# Patient Record
Sex: MALE | Race: WHITE | NOT HISPANIC OR LATINO | Employment: FULL TIME | URBAN - METROPOLITAN AREA
[De-identification: names, ages, dates, MRNs, and addresses within clinical notes are randomized per-mention and may not be internally consistent; named-entity substitution may affect disease eponyms.]

---

## 2023-11-09 ENCOUNTER — TELEPHONE (OUTPATIENT)
Dept: ORTHOPEDIC SURGERY | Facility: CLINIC | Age: 53
End: 2023-11-09
Payer: COMMERCIAL

## 2023-11-13 ENCOUNTER — APPOINTMENT (OUTPATIENT)
Dept: ORTHOPEDIC SURGERY | Facility: CLINIC | Age: 53
End: 2023-11-13
Payer: COMMERCIAL

## 2023-11-15 ENCOUNTER — HOSPITAL ENCOUNTER (OUTPATIENT)
Dept: RADIOLOGY | Facility: HOSPITAL | Age: 53
Discharge: HOME | End: 2023-11-15
Payer: COMMERCIAL

## 2023-11-15 ENCOUNTER — OFFICE VISIT (OUTPATIENT)
Dept: ORTHOPEDIC SURGERY | Facility: CLINIC | Age: 53
End: 2023-11-15
Payer: COMMERCIAL

## 2023-11-15 VITALS — WEIGHT: 235 LBS | HEIGHT: 72 IN | BODY MASS INDEX: 31.83 KG/M2

## 2023-11-15 DIAGNOSIS — M76.31 ILIOTIBIAL BAND SYNDROME OF RIGHT SIDE: ICD-10-CM

## 2023-11-15 DIAGNOSIS — M25.561 RIGHT KNEE PAIN, UNSPECIFIED CHRONICITY: ICD-10-CM

## 2023-11-15 PROCEDURE — 73564 X-RAY EXAM KNEE 4 OR MORE: CPT | Mod: RIGHT SIDE | Performed by: RADIOLOGY

## 2023-11-15 PROCEDURE — 99204 OFFICE O/P NEW MOD 45 MIN: CPT | Performed by: ORTHOPAEDIC SURGERY

## 2023-11-15 PROCEDURE — 73564 X-RAY EXAM KNEE 4 OR MORE: CPT | Mod: RT

## 2023-11-15 RX ORDER — NAPROXEN 500 MG/1
500 TABLET ORAL 2 TIMES DAILY PRN
Qty: 60 TABLET | Refills: 0 | Status: SHIPPED | OUTPATIENT
Start: 2023-11-15 | End: 2023-12-15

## 2023-11-16 NOTE — PROGRESS NOTES
53-year-old male with pain down the lateral aspect of his right leg for the last 4 to 5 weeks.  He does not recall any specific injury but he complains of tightness and pain in this area has been getting worse.  He tried a prednisone prednisone Dosepak with no improvement.  Pain is worse with range of motion better with rest    Patients' self reported past medical history, medications, allergies, surgical history, family and social history as well as a 10 point review of systems has been documented in the new patient intake form and scanned into the patient's electronic medical record.  The intake form was reviewed by Dr Briscoe during the office visit and signed by Dr. Briscoe and the patient.  Pertinent findings are documented in the HPI.    General Multi-System Physical Exam:  Constitutional  General appearance:  Alert, oriented, and in no acute distress.  Well developed, well nourished.  Head and Face  Head and face:  Normocephalic and atraumatic.  Ears, Nose, Mouth, and Throat  External inspection of ears and nose: Normal.  Eyes:  Pupils are equal and round.  Neck  Neck:  no neck mass was observed.  Pulmonary  Respiratory effort:  no respiratory distress.  Cardiovascular  Intact distal pulses.  Lymphatic  Palpation of lymph nodes in the affected extremity:  Normal.  Skin  Skin and subcutaneous tissue:  Normal skin color and pigmentation.  Normal skin turgor.  No rashes.  Neurologic  Sensation:  normal to light touch.  Psychiatric  Judgement and insight:  Intact.  Mood and affect:  Normal.  Musculoskeletal  Pain along the iliotibial band distally and proximally on the right hip.  No pain along the right knee.  No medial or lateral joint line tenderness.  Patient has a negative Lockman exam, negative anterior and negative posterior drawer. The knee is stable to varus and valgus stress without pain. Patient is neurovascularly intact in the bilateral lower extremities.      X-rays of the patient were ordered by   Luis Felipe and obtained today.  Dr Briscoe personally reviewed the results of the x-rays.    In addition, Dr Briscoe independently interpreted the patient's x-rays (performed by the Radiology department) by viewing the x-ray images and this is Dr. Briscoe's personal interpretation:     Normal x-rays right knee    Right lower extremity iliotibial band syndrome.  Given prescription for naproxen and for physical therapy for iliotibial band stretching.  We will see him back as needed    This patient has a new, acute, previously-undiagnosed problem of their affected extremity.  We will begin treatment as listed here and monitor treatment based upon their progression and response to treatment.  Due to the fact that we are just beginning treatment on this issue, this is currently considered an undiagnosed new problem with uncertain prognosis.  The exact diagnosis and their prognosis will depend upon their response to treatment and progression of their condition as time progresses.    Due to this patient's condition, they are at a moderate risk of morbidity from additional diagnostic testing / treatment.      To help them with their pain, I wrote them a prescription for prescription strength anti-inflammatories.  The patient was informed that there are risks of using nonsteroidal antiinflammatory (NSAID) medications.    Risks of NSAIDS include, but are not limited to, upset stomach, ulcers in the stomach and other places in the gastrointestinal tract, and a mild increase in cardiovascular risk as a result of the antiinflammatory medications.  In addition, there is an increased risk in bleeding as a result of the medications.    The patient was advised to stop taking the NSAIDs if they cause them to have an upset stomach.  NSAIDs are not supposed to be taken every day for more than a few weeks.  If they have any questions or problems with the antiinflammatory medications, they should stop taking the medication immediately and call  the office.

## 2023-11-17 ENCOUNTER — TELEPHONE (OUTPATIENT)
Dept: ORTHOPEDIC SURGERY | Facility: CLINIC | Age: 53
End: 2023-11-17
Payer: COMMERCIAL

## 2023-11-17 NOTE — LETTER
"      Date: 2023  RE:  Clement Cordova \"Marcial\"  :  1970      To Whom It May Concern:    Our patient, Clement \"Marcial\", has been under our care and now may return back to work without restrictions.    Their return to work date is:  2023    If you have questions concerning this patient's immediate care, please feel free to contact our office at [phone].    Sincerely,      Jewels Lai  Supervising Provider: SUNNY Briscoe M.D.  "

## 2023-11-17 NOTE — TELEPHONE ENCOUNTER
11/15/23 RT LEG PAIN  Patient needs a RTW note for 11/19/23. Is he able to have that written?    [email or web address]

## 2023-11-26 ENCOUNTER — HOSPITAL ENCOUNTER (EMERGENCY)
Facility: HOSPITAL | Age: 53
Discharge: HOME | End: 2023-11-26
Payer: COMMERCIAL

## 2023-11-26 VITALS
HEART RATE: 85 BPM | RESPIRATION RATE: 18 BRPM | HEIGHT: 72 IN | TEMPERATURE: 98.2 F | OXYGEN SATURATION: 98 % | WEIGHT: 249.56 LBS | SYSTOLIC BLOOD PRESSURE: 138 MMHG | DIASTOLIC BLOOD PRESSURE: 75 MMHG | BODY MASS INDEX: 33.8 KG/M2

## 2023-11-26 DIAGNOSIS — M76.31 IT BAND SYNDROME, RIGHT: Primary | ICD-10-CM

## 2023-11-26 PROCEDURE — 99283 EMERGENCY DEPT VISIT LOW MDM: CPT | Performed by: PHYSICIAN ASSISTANT

## 2023-11-26 PROCEDURE — 99283 EMERGENCY DEPT VISIT LOW MDM: CPT

## 2023-11-26 RX ORDER — LIDOCAINE 560 MG/1
1 PATCH PERCUTANEOUS; TOPICAL; TRANSDERMAL DAILY
Qty: 6 PATCH | Refills: 0 | Status: SHIPPED | OUTPATIENT
Start: 2023-11-26

## 2023-11-26 RX ORDER — METHYLPREDNISOLONE 4 MG/1
TABLET ORAL
Qty: 21 TABLET | Refills: 0 | Status: SHIPPED | OUTPATIENT
Start: 2023-11-26 | End: 2023-12-03

## 2023-11-26 RX ORDER — METHOCARBAMOL 500 MG/1
500 TABLET, FILM COATED ORAL 2 TIMES DAILY
Qty: 20 TABLET | Refills: 0 | Status: SHIPPED | OUTPATIENT
Start: 2023-11-26 | End: 2023-12-06

## 2023-11-26 ASSESSMENT — COLUMBIA-SUICIDE SEVERITY RATING SCALE - C-SSRS
2. HAVE YOU ACTUALLY HAD ANY THOUGHTS OF KILLING YOURSELF?: NO
6. HAVE YOU EVER DONE ANYTHING, STARTED TO DO ANYTHING, OR PREPARED TO DO ANYTHING TO END YOUR LIFE?: NO
1. IN THE PAST MONTH, HAVE YOU WISHED YOU WERE DEAD OR WISHED YOU COULD GO TO SLEEP AND NOT WAKE UP?: NO

## 2023-11-26 ASSESSMENT — PAIN DESCRIPTION - LOCATION: LOCATION: LEG

## 2023-11-26 ASSESSMENT — PAIN SCALES - GENERAL: PAINLEVEL_OUTOF10: 7

## 2023-11-26 ASSESSMENT — PAIN DESCRIPTION - ORIENTATION: ORIENTATION: RIGHT

## 2023-11-26 ASSESSMENT — PAIN DESCRIPTION - DESCRIPTORS: DESCRIPTORS: SHARP

## 2023-11-26 ASSESSMENT — PAIN DESCRIPTION - PAIN TYPE: TYPE: ACUTE PAIN

## 2023-11-26 ASSESSMENT — PAIN - FUNCTIONAL ASSESSMENT: PAIN_FUNCTIONAL_ASSESSMENT: 0-10

## 2023-11-26 NOTE — Clinical Note
Clement Cordova was seen and treated in our emergency department on 11/26/2023.  He may return to work on 11/29/2023.       If you have any questions or concerns, please don't hesitate to call.      Luis Alexander RN

## 2023-11-26 NOTE — Clinical Note
Clement Cordova was seen and treated in our emergency department on 11/26/2023.  He may return to work on 12/02/2023.       If you have any questions or concerns, please don't hesitate to call.      Edwin Pittman PA-C

## 2023-11-27 NOTE — ED PROVIDER NOTES
HPI   Chief Complaint   Patient presents with    Leg Pain     C/o right leg pain x a few weeks, states that he was seen by ortho and told he Has dx of IT bands. 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 Nothing makes him better, worse when working. Requesting a note for time off and advice until he can start his PT. Denies other complaints.                           No data recorded                Patient History   Past Medical History:   Diagnosis Date    Carpal tunnel syndrome, unspecified upper limb 09/08/2017    Acute carpal tunnel syndrome    Other specified soft tissue disorders     Swelling of both hands     Past Surgical History:   Procedure Laterality Date    OTHER SURGICAL HISTORY  05/16/2022    Meniscus repair    OTHER SURGICAL HISTORY  05/16/2022    Shoulder surgery     No family history on file.  Social History     Tobacco Use    Smoking status: Unknown    Smokeless tobacco: Not on file   Substance Use Topics    Alcohol use: Not on file    Drug use: Not on file       Physical Exam   ED Triage Vitals [11/26/23 1959]   Temp Heart Rate Resp BP   36.8 °C (98.2 °F) 89 16 145/90      SpO2 Temp Source Heart Rate Source Patient Position   95 % Tympanic Monitor --      BP Location FiO2 (%)     -- --       Physical Exam  Vitals reviewed.   Constitutional:       General: He is not in acute distress.     Appearance: Normal appearance. He is normal weight. He is not ill-appearing, toxic-appearing or diaphoretic.   HENT:      Head: Normocephalic and atraumatic.      Right Ear: External ear normal.      Left Ear: External ear normal.      Nose: Nose normal.      Mouth/Throat:      Mouth: Mucous membranes are moist.   Eyes:      Extraocular Movements: Extraocular movements intact.      Conjunctiva/sclera:  Conjunctivae normal.      Pupils: Pupils are equal, round, and reactive to light.   Cardiovascular:      Rate and Rhythm: Normal rate and regular rhythm.      Pulses: Normal pulses.   Pulmonary:      Effort: Pulmonary effort is normal. No respiratory distress.      Breath sounds: No stridor.   Abdominal:      General: There is no distension.   Musculoskeletal:         General: Tenderness present. No swelling or deformity.      Cervical back: Normal range of motion.      Comments: R leg tender from back/waist to lower leg, no calf pain.    Skin:     Capillary Refill: Capillary refill takes less than 2 seconds.      Findings: No rash.   Neurological:      General: No focal deficit present.      Mental Status: He is alert and oriented to person, place, and time. Mental status is at baseline.   Psychiatric:         Mood and Affect: Mood normal.         Behavior: Behavior normal.         Thought Content: Thought content normal.         Judgment: Judgment normal.         ED Course & MDM   Diagnoses as of 11/26/23 2207   It band syndrome, right       Medical Decision Making  MEDICAL DECISION MAKING   Number and Complexity of Problems  Differential Diagnosis: it band syndrome, sciatica, , clot but no posterior tenderness.     MDM Data  Treatment and Disposition  Labs Reviewed - No data to display   No orders to display   Medications - No data to display     ED Course: will take meds and follow up.   Chronic health conditions addressed:  Social determinants of health that impact treatment or disposition: affecting work             Procedure  Procedures     Edwin Pitmtan PA-C  11/26/23 1357

## 2023-11-29 ENCOUNTER — EVALUATION (OUTPATIENT)
Dept: PHYSICAL THERAPY | Facility: CLINIC | Age: 53
End: 2023-11-29
Payer: COMMERCIAL

## 2023-11-29 DIAGNOSIS — M76.31 ILIOTIBIAL BAND SYNDROME OF RIGHT SIDE: Primary | ICD-10-CM

## 2023-11-29 PROCEDURE — 97161 PT EVAL LOW COMPLEX 20 MIN: CPT | Mod: GP | Performed by: PHYSICAL THERAPIST

## 2023-11-29 PROCEDURE — 97110 THERAPEUTIC EXERCISES: CPT | Mod: GP | Performed by: PHYSICAL THERAPIST

## 2023-11-29 ASSESSMENT — ENCOUNTER SYMPTOMS
DEPRESSION: 0
LOSS OF SENSATION IN FEET: 0
OCCASIONAL FEELINGS OF UNSTEADINESS: 0

## 2023-11-29 NOTE — PROGRESS NOTES
"Physical Therapy Evaluation    Patient Name: Clement Cordova  MRN: [id number]  Today's Date: 11/29/2023  Visit: 1 (20)  Referred by: Dr. Briscoe  Diagnosis:   1. Iliotibial band syndrome of right side          SUBJECTIVE:  53 y.o. english speaking male with c/o or R) lateral thigh pain. Of insidious onset.  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  Pain at buttock, anterolateral hip and lateral knee.  Pain:  Rest - 0/10  usual - 1-2/10, W- 8/10    Prior level of function:  Drives trunk for living.  Golfs   No limitations prior    OBJECTIVE:  Mild limitation R) hip ADD, ER, IR, flexion and EXT compared to L).  Painful end ranges with ER,IR, extension, and flexion.  TTP in R) TFL and gluts and proximal ITB.  No pain with resisted movements R) hip but mild weakness of ABD and EXT and ER R).  Painful limited DENZEL R)  Painful hip impingement scour R)  Lumbar screen- only pain with JEANCARLOS at end range, same with reps and same after.    Outcome Measure:  HOS- 61%    ASSESSMENT:  )6. With R) buttock, anterolateral hip and lateral knee pain.  Possibly ITB syndrome.  He has soft tissue tightness, hip joint limitations which are painful and affect his functional tolerances.  He requires PT to address these issues.  He tolerated today's session well.    TREATMENT:  - Therex:  HL cross leg ITB S 20\" x 5  Strap prone quad S 20\" x 5  Bent leg fallouts 5\" x 10 x 2  Prone hip IR/ER 2x30  Miles Hip flexor S 60\" x 2  - Manual Therapy:  IASTM to TFL, gluteals and ITB R) LE    PATIENT EDUCATION:  HEP    PLAN:   Daily HEP  PT weekly x 8 weeks  Focusing on improving LE mobility along with gluteal strengthening.  Rehab potential: good  Plan of care agreement:  Y    GOALS:  Active       PT Problem       Pt. will have improved pain free AROM to assist with improving functional tolerances       Start:  11/29/23    Expected End:  02/02/24            Pt. will have improved LE strength to assist " with improving functional tolerances       Start:  11/29/23    Expected End:  02/02/24            Pt. will have improved tolerances for standing and walking       Start:  11/29/23    Expected End:  02/02/24            Pt. will have improved HOS score by at least 15%       Start:  11/29/23    Expected End:  02/02/24            Pt will have improved reports of pain by at least 2 levels using a VAS       Start:  11/29/23    Expected End:  02/02/24            Pt will be independent with HEP       Start:  11/29/23    Expected End:  02/02/24

## 2024-01-30 ENCOUNTER — HOSPITAL ENCOUNTER (OUTPATIENT)
Dept: RADIOLOGY | Facility: EXTERNAL LOCATION | Age: 54
Discharge: HOME | End: 2024-01-30

## 2024-01-30 ENCOUNTER — HOSPITAL ENCOUNTER (OUTPATIENT)
Dept: RADIOLOGY | Facility: EXTERNAL LOCATION | Age: 54
Discharge: HOME | End: 2024-01-30
Payer: COMMERCIAL

## 2024-01-30 DIAGNOSIS — M25.551 RIGHT HIP PAIN: ICD-10-CM

## 2024-01-30 DIAGNOSIS — M54.50 LOW BACK PAIN, UNSPECIFIED BACK PAIN LATERALITY, UNSPECIFIED CHRONICITY, UNSPECIFIED WHETHER SCIATICA PRESENT: ICD-10-CM

## 2024-02-23 ENCOUNTER — EVALUATION (OUTPATIENT)
Dept: PHYSICAL THERAPY | Facility: CLINIC | Age: 54
End: 2024-02-23
Payer: COMMERCIAL

## 2024-02-23 DIAGNOSIS — S76.011D MUSCLE STRAIN OF GLUTEAL REGION, RIGHT, SUBSEQUENT ENCOUNTER: ICD-10-CM

## 2024-02-23 DIAGNOSIS — S76.811D: Primary | ICD-10-CM

## 2024-02-23 PROCEDURE — 97110 THERAPEUTIC EXERCISES: CPT | Mod: GP

## 2024-02-23 PROCEDURE — 97161 PT EVAL LOW COMPLEX 20 MIN: CPT | Mod: GP

## 2024-02-23 ASSESSMENT — ENCOUNTER SYMPTOMS
LOSS OF SENSATION IN FEET: 0
DEPRESSION: 0
OCCASIONAL FEELINGS OF UNSTEADINESS: 0

## 2024-02-23 NOTE — PROGRESS NOTES
Physical Therapy Evaluation    Subjective:  Patient Name: Clement Cordova  MRN: [id number]  Today's Date: 2/23/2024  Visit: 1/12 (2/19/24-3/22/24)  Referred by:  Mini Mckeon/Yomi Danielson    Time in: 8:00 am  Time out: 8:45 am     Diagnosis: R hip pain  Mechanism of Injury:  Patient reports fell when tripped over a hose that was behind him.  Hit R knee, hip and shoulder.  Now, only continued pain is in the R hip/groin.    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  Palpation: TTP R HF    Lumbar ROM:  Flexion= 80% limited ROM  Extension= increased R ant hip pain  Sidebend= WNL  Rotation= WNL    LE Strength (R/L):  Hip flexion= 5 with pain/5  Hip abduction= 5 R  Hip extension= 4+ with stretch ant hip/ 5  Knee flexion= 5/5  Knee extension= 5/5  Ankle DF= 5/5    Denies numbness/tingling in R LE    LE ROM/Flexibility (R/L):  Knee flexion= WNL  Knee extension= WNL  Hip flexion= to 90 passively.  Decreased pain with PROM vs AROM  Hip extension= mild limitation R  Hip abduction= 25 R  Hamstrings= 64 R  Quads= mild tightness bilat    Special Tests:  Pain with bridging  Able to do pelvic tilts  DENZEL/FADIR= negative when done fully passively  +pain with R KTC  Pain ant hip with heel slides  LEFS= 40    Treatment Performed  Today:  Reviewed current exercises patient is performing (from previous PT):  does quad stretch, bent knee fall out, figure 4 L, prone IR/ER, EOB HF stretch    Instructed in and issued for HEP:  Access Code: [email or web address]  URL: [email or web address]  Exercises  - Modified Miles Stretch  - 1 x daily - 7 x weekly - 3-5 reps - 30 hold  - Standing Hip Flexor Stretch  - 1 x daily - 7 x weekly - 3-5 reps - 30 hold  - Supine Bridge  - 1 x daily - 7 x weekly - 10-20 reps  - Supine Heel Slide  - 1 x daily - 7 x weekly - 10-20 reps  - Hooklying Single Knee to Chest Stretch with Towel (Mirrored)  - 1 x daily - 7 x weekly - 3-5 reps - 30 hold  Assessment:  52 yo M s/p fall at work resulting in injury to shoulder, knee and hip.  Shoulder and knee pain have since resolved, with hip pain being primary complaint at this time.  His hip sx are consistent with hip flexor strain.  He would benefit from PT to address his flexibility and strength in order to improve his function and allow eventual return to work.    Plan:  -Goals:         Improve LEFS by at least 15 (PT Problem)   Disciplines:  PT    Expected end:  05/23/24              Patient to report no more than 3/10 pain with activity. (PT Problem)   Disciplines:  PT    Expected end:  05/23/24           Patient able to don/doff his shoes and socks without increased hip pain. (PT Problem)   Disciplines:  PT    Expected end:  05/23/24           Patient able to raise leg into car and to/from gas and brake pedals to aid in driving and return to work. (PT Problem)   Disciplines:  PT    Expected end:  05/23/24           Able to return to work and IADLs (ie: bending fwd/picking up objects from floor) as previously. (PT Problem)   Disciplines:  PT    Expected end:  05/23/24         -Frequency & Duration: 2x/week x 4 weeks  -Rehab Potential: Good    Plan of care was developed with input and agreement of the patient.

## 2024-03-05 ENCOUNTER — TREATMENT (OUTPATIENT)
Dept: PHYSICAL THERAPY | Facility: CLINIC | Age: 54
End: 2024-03-05
Payer: COMMERCIAL

## 2024-03-05 DIAGNOSIS — S76.811D: Primary | ICD-10-CM

## 2024-03-05 DIAGNOSIS — M76.31 ILIOTIBIAL BAND SYNDROME OF RIGHT SIDE: ICD-10-CM

## 2024-03-05 DIAGNOSIS — S76.011D MUSCLE STRAIN OF GLUTEAL REGION, RIGHT, SUBSEQUENT ENCOUNTER: ICD-10-CM

## 2024-03-05 PROCEDURE — 97110 THERAPEUTIC EXERCISES: CPT | Mod: GP,CQ | Performed by: PHYSICAL THERAPY ASSISTANT

## 2024-03-05 PROCEDURE — 97140 MANUAL THERAPY 1/> REGIONS: CPT | Mod: GP,CQ | Performed by: PHYSICAL THERAPY ASSISTANT

## 2024-03-05 NOTE — PROGRESS NOTES
"Physical Therapy Treatment    Patient Name: Clement Cordova  MRN: [id number]  Today's Date: 3/5/2024  Visit# 2  Time in/out 1:00pm-1:48pm (x48')  Diagnosis:   1. Rupture of rectus femoris tendon, right, subsequent encounter        2. Muscle strain of gluteal region, right, subsequent encounter        3. Iliotibial band syndrome of right side             PRECAUTIONS:      SUBJECTIVE:  Pt reports cont pin point pain in anterior region of right hip which is movement induced. He says home exercises are going well and appears to find relief with them.     OBJECTIVE:  Very tight R hip capsule including all planes of motion.     TREATMENT: 1:00pm-1:35pm (x35')  - Therex:   Ktc with SB x20  HLR 10\"x10  H/L hip abd GTB 3x10  Piriformis str 30\"x5  H/L ER str 10\"x10  H/L hip ADD 10\"x10      - Manual Therapy: 1:35pm-1:45pm (x10')   R hip PROM, stretches     - Neuromuscular Re-education:        - Gait Train:       - Modalities:           ASSESSMENT:   Felt more flexible after session. Pain intermittent with ROM especially with release of the stretch. Has potential to respond favorably to PT. Compliant with HEP. b    PLAN:    Cont working on increasing hip ROM through stretches.       "

## 2024-03-07 ENCOUNTER — TREATMENT (OUTPATIENT)
Dept: PHYSICAL THERAPY | Facility: CLINIC | Age: 54
End: 2024-03-07
Payer: COMMERCIAL

## 2024-03-07 DIAGNOSIS — S76.011D MUSCLE STRAIN OF GLUTEAL REGION, RIGHT, SUBSEQUENT ENCOUNTER: ICD-10-CM

## 2024-03-07 DIAGNOSIS — S76.811D: Primary | ICD-10-CM

## 2024-03-07 DIAGNOSIS — M76.31 ILIOTIBIAL BAND SYNDROME OF RIGHT SIDE: ICD-10-CM

## 2024-03-07 PROCEDURE — 97110 THERAPEUTIC EXERCISES: CPT | Mod: GP,CQ | Performed by: PHYSICAL THERAPY ASSISTANT

## 2024-03-07 PROCEDURE — 97140 MANUAL THERAPY 1/> REGIONS: CPT | Mod: GP,CQ | Performed by: PHYSICAL THERAPY ASSISTANT

## 2024-03-07 NOTE — PROGRESS NOTES
"Physical Therapy Treatment    Patient Name: Clement Cordova  MRN: [id number]  Today's Date: 3/7/2024  Visit# 3  Time in/out [phone] (x45')  Diagnosis:   1. Rupture of rectus femoris tendon, right, subsequent encounter        2. Muscle strain of gluteal region, right, subsequent encounter        3. Iliotibial band syndrome of right side               PRECAUTIONS:      SUBJECTIVE:  Pt reports no pain entering therapy but reports cont pain while attempting to elevate hip especially when climbing into this truck. He says he felt good after last session.     OBJECTIVE:  Cont with restricted hip ROM especially IR/ER with hard end feel    TREATMENT: 9:01-9:36 (x35')  - Therex:   Ktc with SB x20  Hip abd on SB x30  HLR 10\"x10-np  H/L hip abd BL TB 3x10  Piriformis str 30\"x5  H/L ER str 10\"x10  H/L hip ADD 10\"x15  Hip flex john on SB 10\"x10  Miles str 30\"x5  Hip flex str on Bosu 30\"x5  S/L hip abd 2x10  S/L clamshells RTB 2x10  - Manual Therapy: 9:36-9:46 (x10')   R hip stretches, PROM with manual TX, x10'    - Neuromuscular Re-education:        - Gait Train:       - Modalities:           ASSESSMENT:  Responds well to stretches. Pain manageable with Rx. Can benefit with cont therapy to address R hip pain and stiffness.     PLAN:    Cont working on increasing hip ROM through stretches.    Access Code: [email or web address]  URL: [email or web address]  Date: 03/07/2024  Prepared by: Chris Ramirez    Exercises  - Sidelying Hip Abduction  - 1 x daily - 7 x weekly - 3 sets - 10 reps  - Clamshell with Resistance  - 1 x daily - 7 x weekly - 3 sets - 10 reps       "

## 2024-03-11 ENCOUNTER — DOCUMENTATION (OUTPATIENT)
Dept: PHYSICAL THERAPY | Facility: CLINIC | Age: 54
End: 2024-03-11
Payer: COMMERCIAL

## 2024-03-12 ENCOUNTER — TREATMENT (OUTPATIENT)
Dept: PHYSICAL THERAPY | Facility: CLINIC | Age: 54
End: 2024-03-12
Payer: COMMERCIAL

## 2024-03-12 DIAGNOSIS — S76.811D: Primary | ICD-10-CM

## 2024-03-12 DIAGNOSIS — M76.31 ILIOTIBIAL BAND SYNDROME OF RIGHT SIDE: ICD-10-CM

## 2024-03-12 DIAGNOSIS — S76.011D MUSCLE STRAIN OF GLUTEAL REGION, RIGHT, SUBSEQUENT ENCOUNTER: ICD-10-CM

## 2024-03-12 PROCEDURE — 97110 THERAPEUTIC EXERCISES: CPT | Mod: GP,CQ | Performed by: PHYSICAL THERAPY ASSISTANT

## 2024-03-12 PROCEDURE — 97140 MANUAL THERAPY 1/> REGIONS: CPT | Mod: GP,CQ | Performed by: PHYSICAL THERAPY ASSISTANT

## 2024-03-12 NOTE — PROGRESS NOTES
"Physical Therapy Treatment    Patient Name: Clement Cordova  MRN: [id number]  Today's Date: 3/12/2024  Visit# 4  Time in/out 197-1918 (43')  Diagnosis:   1. Rupture of rectus femoris tendon, right, subsequent encounter        2. Muscle strain of gluteal region, right, subsequent encounter        3. Iliotibial band syndrome of right side            PRECAUTIONS:      SUBJECTIVE:  He reports steadily feeling slightly better since start of therapy. He states he can lift his hip a little more and can cross his R leg over a little better. He reports feeling approx 20% better.   OBJECTIVE:  Hip ROM steadily improving with firm end feel.     TREATMENT: 9:47-10:20 (x33')  - Therex:  Bke 5', L5  Hip flex str step 5\"x20  Stand hip flex str with UE support 30\"x5  HS str steps 30\"x5  Hip machine (2) abd,flex,ext, 3x10      NP   Ktc with SB x20  Hip abd on SB x30  HLR 10\"x10-np  H/L hip abd BL TB 3x10  Piriformis str 30\"x5  H/L ER str 10\"x10  H/L hip ADD 10\"x15  Hip flex john on SB 10\"x10  Miles str 30\"x5  Hip flex str on Bosu 30\"x5  S/L hip abd 2x10  S/L clamshells RTB 2x10  - Manual Therapy: 10:20-10:30 (x10')   R hip stretches, PROM with manual TX, x10'    - Neuromuscular Re-education:        - Gait Train:       - Modalities:           ASSESSMENT:  Appears to be responding favorably to current plan. Introduced some light strengthening with today's session with good estephania.   PLAN:    Cont strengthening and stretching as estephania.    "

## 2024-03-14 ENCOUNTER — TREATMENT (OUTPATIENT)
Dept: PHYSICAL THERAPY | Facility: CLINIC | Age: 54
End: 2024-03-14
Payer: COMMERCIAL

## 2024-03-14 DIAGNOSIS — S76.811D: ICD-10-CM

## 2024-03-14 DIAGNOSIS — S76.011D MUSCLE STRAIN OF GLUTEAL REGION, RIGHT, SUBSEQUENT ENCOUNTER: ICD-10-CM

## 2024-03-14 PROCEDURE — 97140 MANUAL THERAPY 1/> REGIONS: CPT | Mod: GP,CQ | Performed by: PHYSICAL THERAPY ASSISTANT

## 2024-03-14 PROCEDURE — 97110 THERAPEUTIC EXERCISES: CPT | Mod: GP,CQ | Performed by: PHYSICAL THERAPY ASSISTANT

## 2024-03-14 NOTE — PROGRESS NOTES
"Physical Therapy Treatment    Patient Name: Clement Cordova  MRN: [id number]  Today's Date: 3/14/2024  Visit# 5  Time in/Time out [phone] (x70')  Diagnosis:   1. Rupture of rectus femoris tendon, right, subsequent encounter  Follow Up In Physical Therapy      2. Muscle strain of gluteal region, right, subsequent encounter  Follow Up In Physical Therapy          PRECAUTIONS:      SUBJECTIVE:  Pt reports pain entering therapy just stiffness. He reports still experiencing pain when attempting to elevate his leg getting into his truck.   OBJECTIVE:  Hip IR/ER ROM has improved since coming to therapy.   TREATMENT:  - Therex: 10:35-11:25 (x50')  Bke 7', L5  Hip flex str step 5\"x20  Stand hip flex str with UE support 30\"x5  HS str steps 30\"x5  Hip machine (2) abd,flex,ext, 3x10  Hip flex str on Bosu 30\"x5  S/L hip abd 3x10  S/L clamshells BLTB 3x10  Piriformis str 30\"x5  H/L ER str 10\"x10  H/L hip abd BL TB 3x10    NP   Ktc with SB x20  Hip abd on SB x30  HLR 10\"x10-np  H/L hip ADD 10\"x15  Hip flex john on SB 10\"x10  Milse str 30\"x5  - Manual Therapy: 11:25-11:35 (x10')   R hip stretches, PROM with manual TX, x10'    - Neuromuscular Re-education:        - Gait Train:       - Modalities:           ASSESSMENT:  Good estephania to there ex. Pain remained minimized and stable with session. So far has made good progress with the combination of strengthening and stretches. At this stage is still too early to tell how his hip will respond to full time work duties.  PLAN:    Cont strengthening and stretching as estephania.    "

## 2024-03-19 ENCOUNTER — TREATMENT (OUTPATIENT)
Dept: PHYSICAL THERAPY | Facility: CLINIC | Age: 54
End: 2024-03-19
Payer: COMMERCIAL

## 2024-03-19 DIAGNOSIS — S76.011D MUSCLE STRAIN OF GLUTEAL REGION, RIGHT, SUBSEQUENT ENCOUNTER: Primary | ICD-10-CM

## 2024-03-19 DIAGNOSIS — S76.811D: ICD-10-CM

## 2024-03-19 PROCEDURE — 97110 THERAPEUTIC EXERCISES: CPT | Mod: GP

## 2024-03-19 NOTE — PROGRESS NOTES
Physical Therapy Treatment    Patient Name: Clement Cordova  MRN: [id number]  Today's Date: 3/19/2024  Visit# 6/12 (2/19/24-3/22/24)  Diagnosis: R hip pain    Time in: 1035 am  Time out: 1120 am    SUBJECTIVE:  Patient reports that hip is gradually improving.  Is able to cross leg (in figure 4 position) and reach foot to don shoes.  Feels is still weak in both hip flexor and glutes.  6/10 at most     OBJECTIVE:  Demonstrates ability to now reach R foot.     TREATMENT:  - Therex:  Stationary bike x 5 min, seat= 4  Hip flex str step x 1  min  HS str steps x 1 min  12 inch step ups R x 10 reps  8 inch step ups laterally x 10 reps  Hip machine (2) abd,flex,ext, 3x10  Mat Ex:  -SLR  -Bridging- advanced to near single leg  -HF stretch over EOB    ASSESSMENT:  Making good progress towards goals.  Less pain and improving function.      PLAN:    Recheck if needed next visit.      (TEx3)

## 2024-03-21 ENCOUNTER — TREATMENT (OUTPATIENT)
Dept: PHYSICAL THERAPY | Facility: CLINIC | Age: 54
End: 2024-03-21
Payer: COMMERCIAL

## 2024-03-21 DIAGNOSIS — S76.011D MUSCLE STRAIN OF GLUTEAL REGION, RIGHT, SUBSEQUENT ENCOUNTER: Primary | ICD-10-CM

## 2024-03-21 DIAGNOSIS — S76.811D: ICD-10-CM

## 2024-03-21 DIAGNOSIS — M76.31 ILIOTIBIAL BAND SYNDROME OF RIGHT SIDE: ICD-10-CM

## 2024-03-21 PROCEDURE — 97110 THERAPEUTIC EXERCISES: CPT | Mod: GP

## 2024-03-21 NOTE — PROGRESS NOTES
Physical Therapy Treatment    Patient Name: Clement Cordova  MRN: [id number]  Today's Date: 3/21/2024  Visit# 7/12 (2/19/24-3/29/24)  Diagnosis: R hip pain    Time in: 1035 am  Time out: 1120 am    SUBJECTIVE:  Patient reports felt better after last visit.  Feels that in the last week, PT has really been helping.  Has to bend a lot for work and feels just needs a little more strength to confidently do this.  6/10 at most, but briefly.     OBJECTIVE:  LEFS= 49    LE Strength (R/L):  Hip flexion= 5/5  Hip abduction= R= 5  Hip extension= R= 5  Knee flexion= 5/5  Knee extension= 5/5    R hip flexion= 111  R hip abd= 38    TREATMENT:  Therex:  Stationary bike x 5 min, seat= 4  Hip flex str step x 1  min  12 inch step ups R x 15 reps  8 inch step ups laterally x 15 reps  Hip machine (2) abd,flex,ext, 2x15  [employer]  -stopper at 6, 3x10 reps  BOSU- fwd and lateral lunge x 15 reps each    Briefly rechecked for progress note.  See objective section for details.    ASSESSMENT:  Patient with good improvement lately.  Has less pain, improved function, and ROM.  Would benefit from brief continuation of PT to further address strength for work duties, ie: squatting, bending     PLAN:   Continue through 3/29/24, then will await approval for new or updated C9    (TEx3)

## 2024-03-26 ENCOUNTER — TREATMENT (OUTPATIENT)
Dept: PHYSICAL THERAPY | Facility: CLINIC | Age: 54
End: 2024-03-26
Payer: COMMERCIAL

## 2024-03-26 DIAGNOSIS — S76.011D MUSCLE STRAIN OF GLUTEAL REGION, RIGHT, SUBSEQUENT ENCOUNTER: Primary | ICD-10-CM

## 2024-03-26 DIAGNOSIS — M76.31 ILIOTIBIAL BAND SYNDROME OF RIGHT SIDE: ICD-10-CM

## 2024-03-26 DIAGNOSIS — S76.811D: ICD-10-CM

## 2024-03-26 PROCEDURE — 97110 THERAPEUTIC EXERCISES: CPT | Mod: GP

## 2024-03-26 NOTE — PROGRESS NOTES
Physical Therapy Treatment    Patient Name: Clement Cordova  MRN: [id number]  Today's Date: 3/26/2024   Visit 8/12 (2/19/24-3/29/24)  Time Calculation  Start Time: 1115  Stop Time: 1200  Time Calculation (min): 45 min  1. Muscle strain of gluteal region, right, subsequent encounter        2. Rupture of rectus femoris tendon, right, subsequent encounter        3. Iliotibial band syndrome of right side            PRECAUTIONS: none    SUBJECTIVE:  Has started to add wall squats and side lunges which have been challenging but not painful.  Pain level: No pain currently at rest  Compliant with HEP? yes    OBJECTIVE:  Gait= normalized    TREATMENT:  Therex:  Stationary bike x 5 min, seat= 4  Hip flex str step x 1  min bilat  12 inch step ups R x 15 reps  8 inch step ups laterally x 15 reps  Hip machine (3) abd,flex,ext, 2x15  [employer]  -stopper at 5, 3x10 reps  BOSU- fwd and lateral lunge x 15 reps each  GTB inchworms and monster walks 4x20 ft  Kaylin Slides- hip abd and extension x 12 reps bilat    ASSESSMENT:  Patient is tolerating addition of new strengthening well.  Appropriately challenged.  In general feels more tightness rather than pain with exercises.    EDUCATION:  POC    PLAN:   Patient has follow up with doctor on 3/27.  Will see one more visit on 3/29/24.  Will determine plan at that time depending on if new/updated C9 initiated at that time.      Billing:     PT Therapeutic Procedures Time Entry  Therapeutic Exercise Time Entry: 45

## 2024-03-28 ENCOUNTER — TREATMENT (OUTPATIENT)
Dept: PHYSICAL THERAPY | Facility: CLINIC | Age: 54
End: 2024-03-28
Payer: COMMERCIAL

## 2024-03-28 DIAGNOSIS — M76.31 ILIOTIBIAL BAND SYNDROME OF RIGHT SIDE: ICD-10-CM

## 2024-03-28 DIAGNOSIS — S76.811D: ICD-10-CM

## 2024-03-28 DIAGNOSIS — S76.011D MUSCLE STRAIN OF GLUTEAL REGION, RIGHT, SUBSEQUENT ENCOUNTER: Primary | ICD-10-CM

## 2024-03-28 PROCEDURE — 97110 THERAPEUTIC EXERCISES: CPT | Mod: GP

## 2024-03-28 NOTE — PROGRESS NOTES
Physical Therapy Treatment    Patient Name: Clement Cordova  MRN: [id number]  Today's Date: 3/28/2024   Visit 9/12 (2/19/24-3/29/24)  Time Calculation  Start Time: 0900  Stop Time: 0940  Time Calculation (min): 40 min  R hip pain    PRECAUTIONS: none    SUBJECTIVE:  Patient had follow up with  yesterday.  Will likely release him for work on the 8th which is his next follow up with .  Pain level: No pain currently at rest  Compliant with HEP? yes    OBJECTIVE:  Gait= very mild decreased hip extension at end stance R    TREATMENT:  Therex:  Stationary bike x 5 min, seat= 4  Hip flex str step x 1  min bilat  12 inch step ups R x 15 reps  8 inch step ups laterally x 15 reps  Hip machine (3) abd,flex,ext, 2x15  [employer]  -stopper at 5, 3x10 reps  BOSU- fwd and lateral lunge x 15 reps each  GTB inchworms and monster walks 6x20 ft  Kaylin Slides- hip abd and extension x 12 reps bilat    ASSESSMENT:  Patient with very good tolerance for exercises and with good compliance for HEP.  Reviewed HEP.  Discussed adding 3 way hip and inchworms and monster walks with GTB to HEP    EDUCATION:  Final HEP.    PLAN:   Unsure if C9 time extension will be approved.  Therefore, reviewed/finalized HEP in case unable to return to PT.      Billing:     PT Therapeutic Procedures Time Entry  Therapeutic Exercise Time Entry: 40

## 2024-06-03 ENCOUNTER — DOCUMENTATION (OUTPATIENT)
Dept: PHYSICAL THERAPY | Facility: CLINIC | Age: 54
End: 2024-06-03
Payer: COMMERCIAL

## 2024-06-03 NOTE — PROGRESS NOTES
"Physical Therapy    Discharge Summary    Name: Clement Cordova \"Marcial\"  MRN: [id number]  : 1970  Date: 24    Discharge Summary: PT    Discharge Information: Date of discharge 6/3/24, Date of last visit 3/28/24, Date of evaluation 24, Number of attended visits 9, Referred by Mini Mckeon, Yomi Danielson, and Referred for R hip pain    Therapy Summary: Addressed sx with hip/LE strengthening and flexibility as well as establishment of HEP.    Discharge Status: At last visit, patient reported no pain at rest and likely to return to work.     Rehab Discharge Reason: Achieved all and/or the most significant goals(s).  Also, limited in further visits due to insurance restrictions.  "